# Patient Record
Sex: MALE | Race: WHITE
[De-identification: names, ages, dates, MRNs, and addresses within clinical notes are randomized per-mention and may not be internally consistent; named-entity substitution may affect disease eponyms.]

---

## 2018-09-11 ENCOUNTER — HOSPITAL ENCOUNTER (OUTPATIENT)
Dept: HOSPITAL 53 - M ED INP | Age: 23
Setting detail: OBSERVATION
LOS: 1 days | Discharge: HOME | End: 2018-09-12
Attending: SURGERY | Admitting: SURGERY
Payer: COMMERCIAL

## 2018-09-11 DIAGNOSIS — S11.92XA: ICD-10-CM

## 2018-09-11 DIAGNOSIS — S01.301A: ICD-10-CM
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
## 2018-09-11 DIAGNOSIS — S01.22XA: ICD-10-CM
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
## 2018-09-11 DIAGNOSIS — S62.633B: ICD-10-CM

## 2018-09-11 DIAGNOSIS — S01.02XA: Primary | ICD-10-CM
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
## 2018-09-11 LAB
ALBUMIN/GLOBULIN RATIO: 1.4 (ref 1–1.93)
ALBUMIN: 4.2 GM/DL (ref 3.2–5.2)
ALKALINE PHOSPHATASE: 95 U/L (ref 45–117)
ALT SERPL W P-5'-P-CCNC: 29 U/L (ref 12–78)
ANION GAP: 6 MEQ/L (ref 8–16)
APPEARANCE, URINE: CLEAR
AST SERPL-CCNC: 22 U/L (ref 7–37)
BACTERIA UR QL AUTO: NEGATIVE
BILIRUB CONJ SERPL-MCNC: 0.1 MG/DL (ref 0–0.2)
BILIRUBIN, URINE AUTO: NEGATIVE
BILIRUBIN,TOTAL: 0.4 MG/DL (ref 0.2–1)
BLOOD UREA NITROGEN: 13 MG/DL (ref 7–18)
BLOOD, URINE BLOOD: NEGATIVE
CALCIUM LEVEL: 8.9 MG/DL (ref 8.5–10.1)
CARBON DIOXIDE LEVEL: 28 MEQ/L (ref 21–32)
CHLORIDE LEVEL: 108 MEQ/L (ref 98–107)
CREATININE FOR GFR: 0.86 MG/DL (ref 0.7–1.3)
GFR SERPL CREATININE-BSD FRML MDRD: > 60 ML/MIN/{1.73_M2} (ref 60–?)
GLUCOSE, FASTING: 123 MG/DL (ref 70–100)
GLUCOSE, URINE (UA) AUTO: NEGATIVE MG/DL
HEMATOCRIT: 42.4 % (ref 42–52)
HEMOGLOBIN: 14.1 G/DL (ref 13.5–17.5)
INR: 1.13
KETONE, URINE AUTO: NEGATIVE MG/DL
LEUKOCYTE ESTERASE UR QL STRIP.AUTO: NEGATIVE
MEAN CORPUSCULAR HEMOGLOBIN: 29.3 PG (ref 27–33)
MEAN CORPUSCULAR HGB CONC: 33.3 G/DL (ref 32–36.5)
MEAN CORPUSCULAR VOLUME: 88 FL (ref 80–96)
NITRITE, URINE AUTO: NEGATIVE
NRBC BLD AUTO-RTO: 0 % (ref 0–0)
PH,URINE: 7 UNITS (ref 5–9)
PLATELET COUNT, AUTOMATED: 234 10^3/UL (ref 150–450)
POTASSIUM SERUM: 3.8 MEQ/L (ref 3.5–5.1)
PROT UR QL STRIP.AUTO: NEGATIVE MG/DL
PROTHROMBIN TIME: 14.7 SECONDS (ref 12.1–14.4)
RBC, URINE AUTO: 2 /HPF (ref 0–3)
RED BLOOD COUNT: 4.82 10^6/UL (ref 4.3–6.1)
RED CELL DISTRIBUTION WIDTH: 11.7 % (ref 11.5–14.5)
SODIUM LEVEL: 142 MEQ/L (ref 136–145)
SPECIFIC GRAVITY URINE AUTO: 1.05 (ref 1–1.03)
SQUAMOUS #/AREA URNS AUTO: 0 /HPF (ref 0–6)
TOTAL PROTEIN: 7.2 GM/DL (ref 6.4–8.2)
UROBILINOGEN, URINE AUTO: 0.2 MG/DL (ref 0–2)
WBC, URINE AUTO: 1 /HPF (ref 0–3)
WHITE BLOOD COUNT: 12 10^3/UL (ref 4–10)

## 2018-09-11 PROCEDURE — 90715 TDAP VACCINE 7 YRS/> IM: CPT

## 2018-09-11 RX ADMIN — TETANUS TOXOID, REDUCED DIPHTHERIA TOXOID AND ACELLULAR PERTUSSIS VACCINE, ADSORBED 1 ML: 5; 2.5; 8; 8; 2.5 SUSPENSION INTRAMUSCULAR at 22:32

## 2018-09-11 RX ADMIN — LIDOCAINE HYDROCHLORIDE 1 ML: 20 INJECTION, SOLUTION INFILTRATION; PERINEURAL at 22:31

## 2018-09-11 RX ADMIN — CEFTRIAXONE 1 MLS/HR: 1 INJECTION, POWDER, FOR SOLUTION INTRAMUSCULAR; INTRAVENOUS at 22:32

## 2018-09-11 RX ADMIN — MORPHINE SULFATE 1 MG: 10 INJECTION INTRAVENOUS at 23:23

## 2018-09-11 RX ADMIN — ONDANSETRON 1 MG: 2 INJECTION INTRAMUSCULAR; INTRAVENOUS at 21:15

## 2018-09-12 LAB
ANION GAP: 7 MEQ/L (ref 8–16)
BLOOD UREA NITROGEN: 11 MG/DL (ref 7–18)
CALCIUM LEVEL: 8.5 MG/DL (ref 8.5–10.1)
CARBON DIOXIDE LEVEL: 29 MEQ/L (ref 21–32)
CHLORIDE LEVEL: 106 MEQ/L (ref 98–107)
CREATININE FOR GFR: 0.74 MG/DL (ref 0.7–1.3)
GFR SERPL CREATININE-BSD FRML MDRD: > 60 ML/MIN/{1.73_M2} (ref 60–?)
GLUCOSE, FASTING: 93 MG/DL (ref 70–100)
HEMATOCRIT: 39 % (ref 42–52)
HEMOGLOBIN: 13 G/DL (ref 13.5–17.5)
MEAN CORPUSCULAR HEMOGLOBIN: 29.5 PG (ref 27–33)
MEAN CORPUSCULAR HGB CONC: 33.3 G/DL (ref 32–36.5)
MEAN CORPUSCULAR VOLUME: 88.4 FL (ref 80–96)
NRBC BLD AUTO-RTO: 0 % (ref 0–0)
PLATELET COUNT, AUTOMATED: 209 10^3/UL (ref 150–450)
POTASSIUM SERUM: 3.9 MEQ/L (ref 3.5–5.1)
RED BLOOD COUNT: 4.41 10^6/UL (ref 4.3–6.1)
RED CELL DISTRIBUTION WIDTH: 12.1 % (ref 11.5–14.5)
SODIUM LEVEL: 142 MEQ/L (ref 136–145)
WHITE BLOOD COUNT: 12.1 10^3/UL (ref 4–10)

## 2018-09-12 RX ADMIN — SODIUM CHLORIDE, POTASSIUM CHLORIDE, SODIUM LACTATE AND CALCIUM CHLORIDE 1 MLS/HR: 600; 310; 30; 20 INJECTION, SOLUTION INTRAVENOUS at 07:45

## 2018-09-12 RX ADMIN — KETOROLAC TROMETHAMINE 1 MG: 30 INJECTION, SOLUTION INTRAMUSCULAR at 02:22

## 2018-09-12 RX ADMIN — SODIUM CHLORIDE, POTASSIUM CHLORIDE, SODIUM LACTATE AND CALCIUM CHLORIDE 1 MLS/HR: 600; 310; 30; 20 INJECTION, SOLUTION INTRAVENOUS at 02:04

## 2018-09-12 RX ADMIN — KETOROLAC TROMETHAMINE 1 MG: 30 INJECTION, SOLUTION INTRAMUSCULAR at 07:44

## 2019-03-19 ENCOUNTER — HOSPITAL ENCOUNTER (EMERGENCY)
Dept: HOSPITAL 53 - M ED | Age: 24
Discharge: HOME | End: 2019-03-19
Payer: COMMERCIAL

## 2019-03-19 VITALS — WEIGHT: 160.34 LBS | BODY MASS INDEX: 22.95 KG/M2 | HEIGHT: 70 IN

## 2019-03-19 VITALS — DIASTOLIC BLOOD PRESSURE: 63 MMHG | SYSTOLIC BLOOD PRESSURE: 119 MMHG

## 2019-03-19 DIAGNOSIS — F17.200: ICD-10-CM

## 2019-03-19 DIAGNOSIS — Z28.1: ICD-10-CM

## 2019-03-19 DIAGNOSIS — K92.1: ICD-10-CM

## 2019-03-19 DIAGNOSIS — R51: Primary | ICD-10-CM

## 2019-03-19 LAB
BASOPHILS # BLD AUTO: 0.1 10^3/UL (ref 0–0.2)
BASOPHILS NFR BLD AUTO: 0.9 % (ref 0–1)
BUN SERPL-MCNC: 18 MG/DL (ref 7–18)
CALCIUM SERPL-MCNC: 9.1 MG/DL (ref 8.5–10.1)
CHLORIDE SERPL-SCNC: 108 MEQ/L (ref 98–107)
CO2 SERPL-SCNC: 28 MEQ/L (ref 21–32)
CREAT SERPL-MCNC: 0.94 MG/DL (ref 0.7–1.3)
EOSINOPHIL # BLD AUTO: 0.2 10^3/UL (ref 0–0.5)
EOSINOPHIL NFR BLD AUTO: 2.5 % (ref 0–3)
GFR SERPL CREATININE-BSD FRML MDRD: > 60 ML/MIN/{1.73_M2} (ref 60–?)
GLUCOSE SERPL-MCNC: 107 MG/DL (ref 70–100)
HCT VFR BLD AUTO: 44.8 % (ref 42–52)
HGB BLD-MCNC: 14.8 G/DL (ref 13.5–17.5)
LYMPHOCYTES # BLD AUTO: 2.1 10^3/UL (ref 1.5–6.5)
LYMPHOCYTES NFR BLD AUTO: 32.4 % (ref 24–44)
MCH RBC QN AUTO: 29.4 PG (ref 27–33)
MCHC RBC AUTO-ENTMCNC: 33 G/DL (ref 32–36.5)
MCV RBC AUTO: 89.1 FL (ref 80–96)
MONOCYTES # BLD AUTO: 0.5 10^3/UL (ref 0–0.8)
MONOCYTES NFR BLD AUTO: 7.4 % (ref 0–5)
NEUTROPHILS # BLD AUTO: 3.6 10^3/UL (ref 1.8–7.7)
NEUTROPHILS NFR BLD AUTO: 56.5 % (ref 36–66)
PLATELET # BLD AUTO: 189 10^3/UL (ref 150–450)
POTASSIUM SERPL-SCNC: 4.2 MEQ/L (ref 3.5–5.1)
RBC # BLD AUTO: 5.03 10^6/UL (ref 4.3–6.1)
SODIUM SERPL-SCNC: 142 MEQ/L (ref 136–145)
WBC # BLD AUTO: 6.4 10^3/UL (ref 4–10)

## 2019-03-19 PROCEDURE — 96375 TX/PRO/DX INJ NEW DRUG ADDON: CPT

## 2019-03-19 PROCEDURE — 85025 COMPLETE CBC W/AUTO DIFF WBC: CPT

## 2019-03-19 PROCEDURE — 99284 EMERGENCY DEPT VISIT MOD MDM: CPT

## 2019-03-19 PROCEDURE — 80048 BASIC METABOLIC PNL TOTAL CA: CPT

## 2019-03-19 PROCEDURE — 96374 THER/PROPH/DIAG INJ IV PUSH: CPT

## 2019-03-19 PROCEDURE — 70450 CT HEAD/BRAIN W/O DYE: CPT

## 2019-03-19 NOTE — REP
CT Head without contrast

 

HISTORY:  Headaches

 

COMPARISON: 09/11/2018

 

There is no intraparenchymal hemorrhage, acute infarct,  mass or midline shift.

The ventricular system is normal in appearance.  There is no extra cerebral

collection.  There is no fracture.  The visualized sinuses are clear.

 

IMPRESSION:  There is no intracranial lesion.

 

 

 

 

Electronically Signed by

Charles Kumar MD 03/19/2019 10:22 A